# Patient Record
Sex: FEMALE | Race: WHITE | NOT HISPANIC OR LATINO | URBAN - METROPOLITAN AREA
[De-identification: names, ages, dates, MRNs, and addresses within clinical notes are randomized per-mention and may not be internally consistent; named-entity substitution may affect disease eponyms.]

---

## 2018-03-19 ENCOUNTER — APPOINTMENT (RX ONLY)
Dept: URBAN - METROPOLITAN AREA CLINIC 34 | Facility: CLINIC | Age: 83
Setting detail: DERMATOLOGY
End: 2018-03-19

## 2018-03-19 DIAGNOSIS — L57.8 OTHER SKIN CHANGES DUE TO CHRONIC EXPOSURE TO NONIONIZING RADIATION: ICD-10-CM

## 2018-03-19 DIAGNOSIS — L57.0 ACTINIC KERATOSIS: ICD-10-CM

## 2018-03-19 DIAGNOSIS — L82.1 OTHER SEBORRHEIC KERATOSIS: ICD-10-CM

## 2018-03-19 DIAGNOSIS — D18.0 HEMANGIOMA: ICD-10-CM

## 2018-03-19 PROBLEM — I25.10 ATHEROSCLEROTIC HEART DISEASE OF NATIVE CORONARY ARTERY WITHOUT ANGINA PECTORIS: Status: ACTIVE | Noted: 2018-03-19

## 2018-03-19 PROBLEM — Z85.828 PERSONAL HISTORY OF OTHER MALIGNANT NEOPLASM OF SKIN: Status: ACTIVE | Noted: 2018-03-19

## 2018-03-19 PROBLEM — M12.9 ARTHROPATHY, UNSPECIFIED: Status: ACTIVE | Noted: 2018-03-19

## 2018-03-19 PROBLEM — E78.5 HYPERLIPIDEMIA, UNSPECIFIED: Status: ACTIVE | Noted: 2018-03-19

## 2018-03-19 PROBLEM — D18.01 HEMANGIOMA OF SKIN AND SUBCUTANEOUS TISSUE: Status: ACTIVE | Noted: 2018-03-19

## 2018-03-19 PROBLEM — I10 ESSENTIAL (PRIMARY) HYPERTENSION: Status: ACTIVE | Noted: 2018-03-19

## 2018-03-19 PROBLEM — E03.9 HYPOTHYROIDISM, UNSPECIFIED: Status: ACTIVE | Noted: 2018-03-19

## 2018-03-19 PROBLEM — K21.9 GASTRO-ESOPHAGEAL REFLUX DISEASE WITHOUT ESOPHAGITIS: Status: ACTIVE | Noted: 2018-03-19

## 2018-03-19 PROCEDURE — 17000 DESTRUCT PREMALG LESION: CPT

## 2018-03-19 PROCEDURE — ? COUNSELING

## 2018-03-19 PROCEDURE — 99213 OFFICE O/P EST LOW 20 MIN: CPT | Mod: 25

## 2018-03-19 PROCEDURE — 17003 DESTRUCT PREMALG LES 2-14: CPT

## 2018-03-19 PROCEDURE — ? LIQUID NITROGEN

## 2018-03-19 ASSESSMENT — LOCATION SIMPLE DESCRIPTION DERM
LOCATION SIMPLE: RIGHT FOREARM
LOCATION SIMPLE: LEFT FOREARM
LOCATION SIMPLE: LEFT THIGH
LOCATION SIMPLE: CHEST
LOCATION SIMPLE: RIGHT UPPER ARM
LOCATION SIMPLE: RIGHT FOREHEAD
LOCATION SIMPLE: LEFT CHEEK
LOCATION SIMPLE: RIGHT CHEEK
LOCATION SIMPLE: LEFT UPPER ARM
LOCATION SIMPLE: ABDOMEN
LOCATION SIMPLE: RIGHT THIGH
LOCATION SIMPLE: NOSE

## 2018-03-19 ASSESSMENT — LOCATION DETAILED DESCRIPTION DERM
LOCATION DETAILED: RIGHT ANTERIOR DISTAL THIGH
LOCATION DETAILED: LEFT NASAL ROOT
LOCATION DETAILED: UPPER STERNUM
LOCATION DETAILED: RIGHT MEDIAL FOREHEAD
LOCATION DETAILED: LEFT VENTRAL PROXIMAL FOREARM
LOCATION DETAILED: RIGHT LATERAL MALAR CHEEK
LOCATION DETAILED: RIGHT VENTRAL DISTAL FOREARM
LOCATION DETAILED: RIGHT VENTRAL PROXIMAL FOREARM
LOCATION DETAILED: LEFT ANTERIOR PROXIMAL UPPER ARM
LOCATION DETAILED: LEFT MEDIAL MALAR CHEEK
LOCATION DETAILED: RIGHT ANTERIOR PROXIMAL UPPER ARM
LOCATION DETAILED: LEFT ANTERIOR DISTAL THIGH
LOCATION DETAILED: MIDDLE STERNUM
LOCATION DETAILED: PERIUMBILICAL SKIN

## 2018-03-19 ASSESSMENT — LOCATION ZONE DERM
LOCATION ZONE: TRUNK
LOCATION ZONE: NOSE
LOCATION ZONE: ARM
LOCATION ZONE: FACE
LOCATION ZONE: LEG

## 2018-03-19 ASSESSMENT — PAIN INTENSITY VAS: HOW INTENSE IS YOUR PAIN 0 BEING NO PAIN, 10 BEING THE MOST SEVERE PAIN POSSIBLE?: NO PAIN

## 2018-03-19 NOTE — PROCEDURE: LIQUID NITROGEN
Number Of Freeze-Thaw Cycles: 1 freeze-thaw cycle
Consent: The patient's consent was obtained including but not limited to risks of crusting, scabbing, blistering, scarring, darker or lighter pigmentary change, recurrence, incomplete removal and infection.
Duration Of Freeze Thaw-Cycle (Seconds): 0
Render Post-Care Instructions In Note?: no
Detail Level: Detailed
Post-Care Instructions: I reviewed with the patient in detail post-care instructions. Patient is to wear sunprotection, and avoid picking at any of the treated lesions. Pt may apply Vaseline to crusted or scabbing areas.

## 2022-06-04 ENCOUNTER — TELEPHONE ENCOUNTER (OUTPATIENT)
Dept: URBAN - METROPOLITAN AREA CLINIC 68 | Facility: CLINIC | Age: 87
End: 2022-06-04

## 2022-06-05 ENCOUNTER — TELEPHONE ENCOUNTER (OUTPATIENT)
Dept: URBAN - METROPOLITAN AREA CLINIC 68 | Facility: CLINIC | Age: 87
End: 2022-06-05

## 2022-06-05 RX ORDER — METOPROLOL TARTRATE 50 MG/1
METOPROLOL TARTRATE( 50MG ORAL  DAILY ) ACTIVE -HX ENTRY TABLET, FILM COATED ORAL DAILY
Status: ACTIVE | COMMUNITY
Start: 2017-03-20

## 2022-06-05 RX ORDER — MULTIVITAMIN
MULTIPLE VITAMINS(  ORAL  DAILY ) ACTIVE -HX ENTRY TABLET ORAL DAILY
Status: ACTIVE | COMMUNITY
Start: 2017-03-20

## 2022-06-05 RX ORDER — LEVOTHYROXINE SODIUM 0.05 MG/1
LEVOTHYROXINE SODIUM( 50MCG ORAL  AT BEDTIME ) ACTIVE -HX ENTRY TABLET ORAL AT BEDTIME
Status: ACTIVE | COMMUNITY
Start: 2017-03-20

## 2022-06-05 RX ORDER — OMEPRAZOLE 20 MG/1
OMEPRAZOLE( 20MG ORAL  DAILY ) ACTIVE -HX ENTRY CAPSULE, DELAYED RELEASE ORAL DAILY
Status: ACTIVE | COMMUNITY
Start: 2017-03-20

## 2022-06-05 RX ORDER — FUROSEMIDE 20 MG/1
LASIX( 20MG ORAL 1/2 THREE TIMES A WEEK ) ACTIVE -HX ENTRY TABLET ORAL
Status: ACTIVE | COMMUNITY
Start: 2017-03-20

## 2022-06-05 RX ORDER — MELOXICAM 7.5 MG/1
MELOXICAM( 7.5MG ORAL  AS NEEDED ) ACTIVE -HX ENTRY TABLET ORAL AS NEEDED
Status: ACTIVE | COMMUNITY
Start: 2017-03-20

## 2022-06-25 ENCOUNTER — TELEPHONE ENCOUNTER (OUTPATIENT)
Age: 87
End: 2022-06-25

## 2022-06-26 ENCOUNTER — TELEPHONE ENCOUNTER (OUTPATIENT)
Age: 87
End: 2022-06-26

## 2022-06-26 RX ORDER — OMEPRAZOLE 20 MG/1
OMEPRAZOLE( 20MG ORAL  DAILY ) ACTIVE -HX ENTRY CAPSULE, DELAYED RELEASE ORAL DAILY
Status: ACTIVE | COMMUNITY
Start: 2017-03-20

## 2022-06-26 RX ORDER — MULTIVITAMIN
MULTIPLE VITAMINS(  ORAL  DAILY ) ACTIVE -HX ENTRY TABLET ORAL DAILY
Status: ACTIVE | COMMUNITY
Start: 2017-03-20

## 2022-06-26 RX ORDER — LEVOTHYROXINE SODIUM 50 UG/1
LEVOTHYROXINE SODIUM( 50MCG ORAL  AT BEDTIME ) ACTIVE -HX ENTRY TABLET ORAL AT BEDTIME
Status: ACTIVE | COMMUNITY
Start: 2017-03-20

## 2022-06-26 RX ORDER — MELOXICAM 7.5 MG/1
MELOXICAM( 7.5MG ORAL  AS NEEDED ) ACTIVE -HX ENTRY TABLET ORAL AS NEEDED
Status: ACTIVE | COMMUNITY
Start: 2017-03-20

## 2022-06-26 RX ORDER — METOPROLOL TARTRATE 50 MG/1
METOPROLOL TARTRATE( 50MG ORAL  DAILY ) ACTIVE -HX ENTRY TABLET, FILM COATED ORAL DAILY
Status: ACTIVE | COMMUNITY
Start: 2017-03-20

## 2022-06-26 RX ORDER — FUROSEMIDE 20 MG/1
LASIX( 20MG ORAL 1/2 THREE TIMES A WEEK ) ACTIVE -HX ENTRY TABLET ORAL
Status: ACTIVE | COMMUNITY
Start: 2017-03-20